# Patient Record
Sex: FEMALE | NOT HISPANIC OR LATINO | URBAN - METROPOLITAN AREA
[De-identification: names, ages, dates, MRNs, and addresses within clinical notes are randomized per-mention and may not be internally consistent; named-entity substitution may affect disease eponyms.]

---

## 2018-11-05 ENCOUNTER — TELEPHONE (OUTPATIENT)
Dept: NEPHROLOGY | Facility: CLINIC | Age: 48
End: 2018-11-05

## 2018-11-05 NOTE — TELEPHONE ENCOUNTER
Called patient in regards to rescheduling her no show apt date 11/01 at 930 Am  No answer left a message for a call back so we can reschedule that missed apt

## 2018-11-06 ENCOUNTER — TELEPHONE (OUTPATIENT)
Dept: NEPHROLOGY | Facility: CLINIC | Age: 48
End: 2018-11-06

## 2018-11-06 NOTE — TELEPHONE ENCOUNTER
Second attempt at trying to get in contact with patient in regards to rescheduling her new patient apt that she is listed as a no show from Nov  1st  Left voicemail with our call back number

## 2018-11-07 ENCOUNTER — TELEPHONE (OUTPATIENT)
Dept: NEPHROLOGY | Facility: CLINIC | Age: 48
End: 2018-11-07

## 2018-11-07 NOTE — TELEPHONE ENCOUNTER
3rd attempt at trying to reschedule pt for her new pt apt that she missed on 11/01/2018  Left call back number to reschedule  Will be sending her a missed apt letter   11/07/2018

## 2024-02-15 ENCOUNTER — OFFICE VISIT (OUTPATIENT)
Age: 54
End: 2024-02-15
Payer: COMMERCIAL

## 2024-02-15 VITALS
HEART RATE: 102 BPM | WEIGHT: 142 LBS | BODY MASS INDEX: 23.66 KG/M2 | HEIGHT: 65 IN | RESPIRATION RATE: 17 BRPM | OXYGEN SATURATION: 98 % | TEMPERATURE: 97.2 F | SYSTOLIC BLOOD PRESSURE: 122 MMHG | DIASTOLIC BLOOD PRESSURE: 78 MMHG

## 2024-02-15 DIAGNOSIS — L08.9 PIERCED LIP INFECTION: Primary | ICD-10-CM

## 2024-02-15 DIAGNOSIS — S01.531A PIERCED LIP INFECTION: Primary | ICD-10-CM

## 2024-02-15 DIAGNOSIS — J01.90 ACUTE BACTERIAL SINUSITIS: ICD-10-CM

## 2024-02-15 DIAGNOSIS — B96.89 ACUTE BACTERIAL SINUSITIS: ICD-10-CM

## 2024-02-15 PROCEDURE — 99203 OFFICE O/P NEW LOW 30 MIN: CPT | Performed by: PHYSICIAN ASSISTANT

## 2024-02-15 RX ORDER — FLUTICASONE PROPIONATE 50 MCG
2 SPRAY, SUSPENSION (ML) NASAL DAILY
Qty: 16 G | Refills: 0 | Status: SHIPPED | OUTPATIENT
Start: 2024-02-15

## 2024-02-15 RX ORDER — AMOXICILLIN AND CLAVULANATE POTASSIUM 875; 125 MG/1; MG/1
1 TABLET, FILM COATED ORAL EVERY 12 HOURS SCHEDULED
Qty: 14 TABLET | Refills: 0 | Status: SHIPPED | OUTPATIENT
Start: 2024-02-15 | End: 2024-02-22

## 2024-02-15 RX ORDER — BUSPIRONE HYDROCHLORIDE 5 MG/1
5 TABLET ORAL 2 TIMES DAILY
COMMUNITY
Start: 2024-01-17

## 2024-02-15 RX ORDER — LUMATEPERONE 10.5 MG/1
1 CAPSULE ORAL
COMMUNITY
Start: 2024-01-03

## 2024-02-15 RX ORDER — DOXEPIN HYDROCHLORIDE 50 MG/1
50 CAPSULE ORAL
COMMUNITY
Start: 2024-01-17

## 2024-02-15 NOTE — PROGRESS NOTES
"  [facility]        NAME: Shilpa Kauffman is a 53 y.o. female  : 1970    MRN: [id number]  DATE: February 15, 2024  TIME: 4:51 PM    Assessment and Plan   Pierced lip infection [S01.531A, L08.9]  1. Pierced lip infection  amoxicillin-clavulanate (AUGMENTIN) 875-125 mg per tablet    mupirocin (BACTROBAN) 2 % ointment      2. Acute bacterial sinusitis  amoxicillin-clavulanate (AUGMENTIN) 875-125 mg per tablet    fluticasone (FLONASE) 50 mcg/act nasal spray            Patient Instructions     Pt has lower lip piercing that appears infected along with sinusitis. I prescribed her Augmentin, Mupirocin, and Flonase. Rec fluids, rest, discussed OTC cold meds, close observation. If issues with piercing persist, then I advised pt that she may need to remove.   Follow up with PCP in 3-5 days.  Proceed to  ER if symptoms worsen.    Chief Complaint     Chief Complaint   Patient presents with    Facial Pain     X8 days ago - patient got lips pierced. Concerned for infection in area. Also states that for two months has intermittently had pain and white drainage from right nostril. Drainage looks like \"cottage cheese\".         History of Present Illness       Pt presents with what concerns her for possible lip piercing infection. Had lip pierced over one week ago and now has some swelling and drainage from the area. She also has had right-sided thick nasal discharge for a much longer period of time. She denies fever, chills, active bleeding from pierced area, or any other symptoms.        Review of Systems   Review of Systems   Constitutional: Negative.    HENT:  Positive for congestion.         Possible lip piercing infection   Respiratory: Negative.     Cardiovascular: Negative.    Gastrointestinal: Negative.    Genitourinary: Negative.          Current Medications       Current Outpatient Medications:     amoxicillin-clavulanate (AUGMENTIN) 875-125 mg per tablet, Take 1 tablet by mouth every 12 (twelve) hours for 7 " "days, Disp: 14 tablet, Rfl: 0    busPIRone (BUSPAR) 5 mg tablet, Take 5 mg by mouth 2 (two) times a day, Disp: , Rfl:     Caplyta 10.5 MG CAPS, Take 1 capsule by mouth daily at bedtime, Disp: , Rfl:     doxepin (SINEquan) 50 mg capsule, Take 50 mg by mouth daily at bedtime, Disp: , Rfl:     fluticasone (FLONASE) 50 mcg/act nasal spray, 2 sprays into each nostril daily, Disp: 16 g, Rfl: 0    mupirocin (BACTROBAN) 2 % ointment, Apply topically 2 (two) times a day, Disp: 22 g, Rfl: 0    Current Allergies     Allergies as of 02/15/2024    (No Known Allergies)            The following portions of the patient's history were reviewed and updated as appropriate: allergies, current medications, past family history, past medical history, past social history, past surgical history and problem list.     Past Medical History:   Diagnosis Date    Hyponatremia        Past Surgical History:   Procedure Laterality Date    HYSTERECTOMY      NOSE SURGERY         History reviewed. No pertinent family history.      Medications have been verified.        Objective   /78 (BP Location: Right arm, Patient Position: Sitting, Cuff Size: Standard)   Pulse 102   Temp (!) 97.2 °F (36.2 °C) (Tympanic)   Resp 17   Ht 5' 5\" (1.651 m)   Wt 64.4 kg (142 lb)   SpO2 98%   BMI 23.63 kg/m²   No LMP recorded. Patient has had a hysterectomy.       Physical Exam     Physical Exam  Vitals reviewed.   Constitutional:       General: She is not in acute distress.     Appearance: She is well-developed.   HENT:      Right Ear: Tympanic membrane, ear canal and external ear normal.      Left Ear: Tympanic membrane, ear canal and external ear normal.      Nose: Mucosal edema, congestion and rhinorrhea (right nostril) present. Rhinorrhea is purulent.      Mouth/Throat:      Mouth: Mucous membranes are moist.      Pharynx: Oropharynx is clear.      Comments: Right lower lip with piercing in place. Inner lip with mild localized STS and whitish " discoloration. No active bleeding.   Musculoskeletal:      Cervical back: Neck supple.   Lymphadenopathy:      Cervical: No cervical adenopathy.   Neurological:      Mental Status: She is alert and oriented to person, place, and time.